# Patient Record
Sex: MALE | Race: WHITE | NOT HISPANIC OR LATINO | Employment: OTHER | URBAN - METROPOLITAN AREA
[De-identification: names, ages, dates, MRNs, and addresses within clinical notes are randomized per-mention and may not be internally consistent; named-entity substitution may affect disease eponyms.]

---

## 2018-08-02 ENCOUNTER — OFFICE VISIT (OUTPATIENT)
Dept: FAMILY MEDICINE | Facility: CLINIC | Age: 48
End: 2018-08-02
Payer: MEDICARE

## 2018-08-02 VITALS
DIASTOLIC BLOOD PRESSURE: 76 MMHG | RESPIRATION RATE: 16 BRPM | HEIGHT: 70 IN | WEIGHT: 131.4 LBS | BODY MASS INDEX: 18.81 KG/M2 | TEMPERATURE: 96.9 F | HEART RATE: 68 BPM | SYSTOLIC BLOOD PRESSURE: 110 MMHG

## 2018-08-02 DIAGNOSIS — L60.0 INGROWN LEFT BIG TOENAIL: Primary | ICD-10-CM

## 2018-08-02 PROCEDURE — 99213 OFFICE O/P EST LOW 20 MIN: CPT | Performed by: NURSE PRACTITIONER

## 2018-08-02 RX ORDER — CEPHALEXIN 500 MG/1
1000 CAPSULE ORAL 2 TIMES DAILY
Qty: 40 CAPSULE | Refills: 0 | Status: SHIPPED | OUTPATIENT
Start: 2018-08-02 | End: 2020-01-27

## 2018-08-02 ASSESSMENT — ENCOUNTER SYMPTOMS
SHORTNESS OF BREATH: 0
EYE DISCHARGE: 0
SINUS PRESSURE: 0
VOMITING: 0
FEVER: 0
SORE THROAT: 0
RHINORRHEA: 0
WHEEZING: 0
DIARRHEA: 0
HEADACHES: 0
COUGH: 0
FATIGUE: 0
DIAPHORESIS: 0
NAUSEA: 0

## 2018-08-02 ASSESSMENT — PAIN SCALES - GENERAL: PAINLEVEL: SEVERE PAIN (6)

## 2018-08-02 NOTE — PROGRESS NOTES
"  SUBJECTIVE:   Bonilla Chowdhury is a 48 year old male who presents to clinic today for the following health issues:      Chief Complaint   Patient presents with     Ingrown Toenail     Left great toenail ingrown for some time. Has been hurting for one week. Tried removing side of toenail himself. A piece of the toenail is stuck in toe. Noticed some yellow/tan pus over the last couple of days. Did apply bacitracin a few times.     Problem list and histories reviewed & adjusted, as indicated.  Additional history: as documented    Current Outpatient Prescriptions   Medication Sig Dispense Refill     cephALEXin (KEFLEX) 500 MG capsule Take 2 capsules (1,000 mg) by mouth 2 times daily 40 capsule 0     No Known Allergies    Reviewed and updated as needed this visit by clinical staff  Tobacco  Allergies  Meds  Problems  Med Hx  Surg Hx  Fam Hx  Soc Hx        Reviewed and updated as needed this visit by Provider  Problems          Left great toe has been hurting for the last week. Both big toes get ingrown due to how carries weight. Left toe is \"\" of the 2. When feels ingrown will be able to cut down and cut nail out. Tried to do it, and nail tore off and is not able to get it out.     ROS:  Review of Systems   Constitutional: Negative for diaphoresis, fatigue and fever.   HENT: Negative for congestion, ear pain, rhinorrhea, sinus pressure and sore throat.    Eyes: Negative for discharge.   Respiratory: Negative for cough, shortness of breath and wheezing.    Cardiovascular: Negative for chest pain.   Gastrointestinal: Negative for diarrhea, nausea and vomiting.   Skin:        Left great toe with tenderness and erythema    Neurological: Negative for headaches.         OBJECTIVE:     /76 (BP Location: Right arm, Patient Position: Sitting, Cuff Size: Adult Regular)  Pulse 68  Temp 96.9  F (36.1  C) (Tympanic)  Resp 16  Ht 5' 9.5\" (1.765 m)  Wt 131 lb 6.4 oz (59.6 kg)  BMI 19.13 kg/m2  Body mass index " is 19.13 kg/(m^2).  Physical Exam   Constitutional: He appears well-developed and well-nourished.   HENT:   Head: Normocephalic and atraumatic.   Right Ear: Tympanic membrane and external ear normal.   Left Ear: Tympanic membrane and external ear normal.   Nose: No mucosal edema or rhinorrhea.   Cardiovascular: Normal rate, regular rhythm and normal heart sounds.    Pulmonary/Chest: Effort normal and breath sounds normal.   Abdominal: Soft. Bowel sounds are normal.   Musculoskeletal:        Feet:    Neurological: He is alert.   Skin: Skin is warm and dry.   Psychiatric: He has a normal mood and affect.       ASSESSMENT/PLAN:   1. Ingrown left big toenail  Plan to refer to podiatry.  Interested in having bilateral great toenails removed permanently.  Encouraged to soak toes.  Educated on use of medication.  - cephALEXin (KEFLEX) 500 MG capsule; Take 2 capsules (1,000 mg) by mouth 2 times daily  Dispense: 40 capsule; Refill: 0  - PODIATRY/FOOT & ANKLE SURGERY REFERRAL        [facility]

## 2018-08-02 NOTE — MR AVS SNAPSHOT
After Visit Summary   8/2/2018    Bonilla Chowdhury    MRN: [id number]           Patient Information     Date Of Birth          1970        Visit Information        Provider Department      8/2/2018 9:40 AM Tiffanie Maria APRN CNP [facility]        Today's Diagnoses     Ingrown left big toenail    -  1       Follow-ups after your visit        Additional Services     PODIATRY/FOOT & ANKLE SURGERY REFERRAL       Your provider has referred you to: [facility]: [facility] [phone]   [email or web address]    Please be aware that coverage of these services is subject to the terms and limitations of your health insurance plan.  Call member services at your health plan with any benefit or coverage questions.      Please bring the following to your appointment:  >>   Any x-rays, CTs or MRIs which have been performed.  Contact the facility where they were done to arrange for  prior to your scheduled appointment.    >>   List of current medications   >>   This referral request   >>   Any documents/labs given to you for this referral                  Who to contact     Normal or non-critical lab and imaging results will be communicated to you by Andahart, letter or phone within 4 business days after the clinic has received the results. If you do not hear from us within 7 days, please contact the clinic through Andahart or phone. If you have a critical or abnormal lab result, we will notify you by phone as soon as possible.  Submit refill requests through [employer] or call your pharmacy and they will forward the refill request to us. Please allow 3 business days for your refill to be completed.          If you need to speak with a  for additional information , please call: [phone]           Additional Information About Your Visit        [employer] Information     [employer] lets you send messages to your doctor, view your  "test results, renew your prescriptions, schedule appointments and more. To sign up, go to [email or web address] . Click on \"Log in\" on the left side of the screen, which will take you to the Welcome page. Then click on \"Sign up Now\" on the right side of the page.     You will be asked to enter the access code listed below, as well as some personal information. Please follow the directions to create your username and password.     Your access code is: [email or web address]  Expires: 10/31/2018 10:02 AM     Your access code will  in 90 days. If you need help or a new code, please call your [facility] clinic or [phone].        Care EveryWhere ID     This is your Care EveryWhere ID. This could be used by other organizations to access your [facility] medical records  [id number]        Your Vitals Were     Pulse Temperature Respirations Height BMI (Body Mass Index)       68 96.9  F (36.1  C) (Tympanic) 16 5' 9.5\" (1.765 m) 19.13 kg/m2        Blood Pressure from Last 3 Encounters:   18 110/76   16 108/66   08/18/15 124/72    Weight from Last 3 Encounters:   18 131 lb 6.4 oz (59.6 kg)   16 145 lb (65.8 kg)   08/18/15 147 lb 6.4 oz (66.9 kg)              We Performed the Following     PODIATRY/FOOT & ANKLE SURGERY REFERRAL          Today's Medication Changes          These changes are accurate as of 18 10:02 AM.  If you have any questions, ask your nurse or doctor.               Start taking these medicines.        Dose/Directions    cephALEXin 500 MG capsule   Commonly known as:  KEFLEX   Used for:  Ingrown left big toenail   Started by:  Tiffanie Maria APRN CNP        Dose:  1000 mg   Take 2 capsules (1,000 mg) by mouth 2 times daily   Quantity:  40 capsule   Refills:  0            Where to get your medicines      These medications were sent to [facility] [address] [facility] [place], [place] - [address]  [address], [place] [facility] [address]     Phone:  [phone]     cephALEXin 500 MG " capsule                Primary Care Provider Office Phone # Fax #    Deja Meyers PA-C [phone] [address] [place] [address]        Equal Access to Services     DAYAN GAO : Hadii gege ku haddavido Sofelishaali, waaxda luqadaha, qaybta kaalmada adeegyada, waxdennise krusen inessa brenner laEarnestvelvet holland. So [facility] [phone].    ATENCIÓN: Si habla español, tiene a knight disposición servicios gratuitos de asistencia lingüística. Llame al [phone].    We comply with applicable federal civil rights laws and [place] laws. We do not discriminate on the basis of race, color, national origin, age, disability, sex, sexual orientation, or gender identity.            Thank you!     Thank you for choosing [facility]  for your care. Our goal is always to provide you with excellent care. Hearing back from our patients is one way we can continue to improve our services. Please take a few minutes to complete the written survey that you may receive in the mail after your visit with us. Thank you!             Your Updated Medication List - Protect others around you: Learn how to safely use, store and throw away your medicines at www.disposemymeds.org.          This list is accurate as of 8/2/18 10:02 AM.  Always use your most recent med list.                   Brand Name Dispense Instructions for use Diagnosis    cephALEXin 500 MG capsule    KEFLEX    40 capsule    Take 2 capsules (1,000 mg) by mouth 2 times daily    Ingrown left big toenail

## 2018-08-03 ENCOUNTER — OFFICE VISIT (OUTPATIENT)
Dept: PODIATRY | Facility: CLINIC | Age: 48
End: 2018-08-03
Payer: MEDICARE

## 2018-08-03 VITALS — HEIGHT: 70 IN | HEART RATE: 68 BPM | BODY MASS INDEX: 18.75 KG/M2 | WEIGHT: 131 LBS

## 2018-08-03 DIAGNOSIS — L60.0 INGROWING NAIL, RIGHT GREAT TOE: ICD-10-CM

## 2018-08-03 DIAGNOSIS — L60.0 INGROWING LEFT GREAT TOENAIL: Primary | ICD-10-CM

## 2018-08-03 PROCEDURE — 99203 OFFICE O/P NEW LOW 30 MIN: CPT | Mod: 25 | Performed by: PODIATRIST

## 2018-08-03 PROCEDURE — 11750 EXCISION NAIL&NAIL MATRIX: CPT | Performed by: PODIATRIST
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
## 2018-08-03 NOTE — MR AVS SNAPSHOT
After Visit Summary   8/3/2018    Bonilla Chowdhury    MRN: [id number]           Patient Information     Date Of Birth          1970        Visit Information        Provider Department      8/3/2018 11:00 AM Jeanmarie Zayas DPM [facility]        Today's Diagnoses     Ingrowing left great toenail    -  1    Ingrowing nail, right great toe          Care Instructions    Post toenail procedure instructions:    1.  Keep bandage on the rest of the day; take off in the morning.  2.  Soak the toe in warm water with Epsom's Salt or Dreft detergent for 20 min.  3.  Clean out any scab tissue from the treated area with a soapy cloth.  4.  Apply a topical antibiotic to the treated area and bandage with a Band-Aid.  5.  Repeat morning and night for two weeks    You have elected to proceed with Surgery for total surgical nail matrixectomy right great toe  Surgeries are performed on Tuesdays at [facility].   To schedule your surgery date please call [phone].  Please leave a message with a good time for our staff to call you back.    - Please have a date in mind for your surgery, you can feel free to leave that date on the message, and we will schedule and call back to confirm.     You can expect receive a call back the same day or on the next business day from Dr. Zayas s team to assist in the scheduling.   - We will schedule the date of your surgery.  The time will be determined a few days ahead of time.  You can expect a call from Same Day Surgery 2-3 days ahead of time with specific instructions for what time to arrive at the hospital as well as any other preparations you should take prior to surgery.    - You may need to obtain a pre-operative physical from your primary medical provider. This must be done within 30 days of your surgery date.    - We will also schedule your first post-operative appointment for a bandage and wound check for the Monday following your  "surgery at the [place] location.    - You may be non-weight bearing for a period of up to 6 weeks.  Options for this include: (Please indicate which you would prefer so we can provide you with an order and instructions)  o Crutches  o Walker  o Roll-a-bout knee walker.    - If you will need paperwork filled out for your employer you may drop those off at the clinic directly or you may have those faxed to us at [phone].  Please indicate on the form the date you would like the FMLA to begin if it will not be your surgery date.    The forms are typically filled out for up to 12 weeks, however you may be cleared to return prior to that time depending on your individual healing and job requirements.              Follow-ups after your visit        Follow-up notes from your care team     Return if symptoms worsen or fail to improve.      Who to contact     If you have questions or need follow up information about today's clinic visit or your schedule please contact [facility] WANDA SYBIL directly at [phone].  Normal or non-critical lab and imaging results will be communicated to you by [employer], letter or phone within 4 business days after the clinic has received the results. If you do not hear from us within 7 days, please contact the clinic through [employer] or phone. If you have a critical or abnormal lab result, we will notify you by phone as soon as possible.  Submit refill requests through [employer] or call your pharmacy and they will forward the refill request to us. Please allow 3 business days for your refill to be completed.          Additional Information About Your Visit        [employer] Information     [employer] lets you send messages to your doctor, view your test results, renew your prescriptions, schedule appointments and more. To sign up, go to [email or web address] . Click on \"Log in\" on the left side of the screen, which will take you to the Welcome page. Then click on \"Sign up Now\" on the right " "side of the page.     You will be asked to enter the access code listed below, as well as some personal information. Please follow the directions to create your username and password.     Your access code is: [email or web address]  Expires: 10/31/2018 10:02 AM     Your access code will  in 90 days. If you need help or a new code, please call your [place] clinic or [phone].        Care EveryWhere ID     This is your Care EveryWhere ID. This could be used by other organizations to access your [place] medical records  [id number]        Your Vitals Were     Pulse Height BMI (Body Mass Index)             68 5' 9.5\" (1.765 m) 19.07 kg/m2          Blood Pressure from Last 3 Encounters:   18 110/76   16 108/66   08/18/15 124/72    Weight from Last 3 Encounters:   18 131 lb (59.4 kg)   18 131 lb 6.4 oz (59.6 kg)   16 145 lb (65.8 kg)              We Performed the Following     Gretchen-Operative Worksheet     REMOVAL NAIL/NAIL BED, PARTIAL OR COMPLETE        Primary Care Provider Office Phone # Fax #    [facility] [phone] [address] [address]        Equal Access to Services     DAYAN GAO : Hadii gege turnero Soelsy, waaxda luqadaha, qaybta kaalmada adeegyada, antonio torres [facility] camp . So [facility] [phone].    ATENCIÓN: Si habla español, tiene a knight disposición servicios gratuitos de asistencia lingüística. Llame al [phone].    We comply with applicable federal civil rights laws and [place] laws. We do not discriminate on the basis of race, color, national origin, age, disability, sex, sexual orientation, or gender identity.            Thank you!     Thank you for choosing [facility]  for your care. Our goal is always to provide you with excellent care. Hearing back from our patients is one way we can continue to improve our services. Please take a few minutes to complete the written survey " that you may receive in the mail after your visit with us. Thank you!             Your Updated Medication List - Protect others around you: Learn how to safely use, store and throw away your medicines at www.disposemymeds.org.          This list is accurate as of 8/3/18 11:26 AM.  Always use your most recent med list.                   Brand Name Dispense Instructions for use Diagnosis    cephALEXin 500 MG capsule    KEFLEX    40 capsule    Take 2 capsules (1,000 mg) by mouth 2 times daily    Ingrown left big toenail

## 2018-08-03 NOTE — PATIENT INSTRUCTIONS
Post toenail procedure instructions:    1.  Keep bandage on the rest of the day; take off in the morning.  2.  Soak the toe in warm water with Epsom's Salt or Dreft detergent for 20 min.  3.  Clean out any scab tissue from the treated area with a soapy cloth.  4.  Apply a topical antibiotic to the treated area and bandage with a Band-Aid.  5.  Repeat morning and night for two weeks    You have elected to proceed with Surgery for total surgical nail matrixectomy right great toe  Surgeries are performed on Tuesdays at [facility].   To schedule your surgery date please call [phone].  Please leave a message with a good time for our staff to call you back.    - Please have a date in mind for your surgery, you can feel free to leave that date on the message, and we will schedule and call back to confirm.     You can expect receive a call back the same day or on the next business day from Dr. Zayas s team to assist in the scheduling.   - We will schedule the date of your surgery.  The time will be determined a few days ahead of time.  You can expect a call from Same Day Surgery 2-3 days ahead of time with specific instructions for what time to arrive at the hospital as well as any other preparations you should take prior to surgery.    - You may need to obtain a pre-operative physical from your primary medical provider. This must be done within 30 days of your surgery date.    - We will also schedule your first post-operative appointment for a bandage and wound check for the Monday following your surgery at the [place] location.    - You may be non-weight bearing for a period of up to 6 weeks.  Options for this include: (Please indicate which you would prefer so we can provide you with an order and instructions)  o Crutches  o Walker  o Roll-a-bout knee walker.    - If you will need paperwork filled out for your employer you may drop those off at the clinic directly or you may have those faxed to us  at [phone].  Please indicate on the form the date you would like the FMLA to begin if it will not be your surgery date.    The forms are typically filled out for up to 12 weeks, however you may be cleared to return prior to that time depending on your individual healing and job requirements.

## 2020-01-24 ENCOUNTER — OFFICE VISIT (OUTPATIENT)
Dept: PODIATRY | Facility: CLINIC | Age: 50
End: 2020-01-24
Payer: MEDICARE

## 2020-01-24 VITALS
WEIGHT: 136 LBS | HEIGHT: 71 IN | BODY MASS INDEX: 19.04 KG/M2 | SYSTOLIC BLOOD PRESSURE: 120 MMHG | DIASTOLIC BLOOD PRESSURE: 75 MMHG | HEART RATE: 80 BPM

## 2020-01-24 DIAGNOSIS — L60.0 INGROWN TOENAIL OF RIGHT FOOT: Primary | ICD-10-CM

## 2020-01-24 PROCEDURE — 99213 OFFICE O/P EST LOW 20 MIN: CPT | Performed by: PODIATRIST

## 2020-01-24 ASSESSMENT — MIFFLIN-ST. JEOR: SCORE: 1514.02
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
## 2020-01-24 NOTE — NURSING NOTE
"Chief Complaint   Patient presents with     Consult     Great right toenail ingrown, wants to discuss surgery       Initial /75   Pulse 80   Ht 1.803 m (5' 11\")   Wt 61.7 kg (136 lb)   BMI 18.97 kg/m   Estimated body mass index is 18.97 kg/m  as calculated from the following:    Height as of this encounter: 1.803 m (5' 11\").    Weight as of this encounter: 61.7 kg (136 lb).  Medications and allergies reviewed.      Aurea LOO MA    "

## 2020-01-27 PROBLEM — L60.0 INGROWN TOENAIL OF RIGHT FOOT: Status: ACTIVE | Noted: 2020-01-27

## 2020-01-27 NOTE — PATIENT INSTRUCTIONS
You have elected to proceed with Surgery for total surgical nail matrixectomy right great toe  Surgeries are performed on Tuesdays at [facility].   To schedule your surgery date please call [phone].  Please leave a message with a good time for our staff to call you back.    - Please have a date in mind for your surgery, you can feel free to leave that date on the message, and we will schedule and call back to confirm.     You can expect receive a call back the same day or on the next business day from Dr. Zayas s team to assist in the scheduling.   - We will schedule the date of your surgery.  The time will be determined a few days ahead of time.  You can expect a call from Same Day Surgery 2-3 days ahead of time with specific instructions for what time to arrive at the hospital as well as any other preparations you should take prior to surgery.    - You may need to obtain a pre-operative physical from your primary medical provider. This must be done within 30 days of your surgery date.    - We will also schedule your first post-operative appointment for a bandage and wound check for the Monday following your surgery at the [facility].    - You may be non-weight bearing for a period of up to 6 weeks.  Options for this include: (Please indicate which you would prefer so we can provide you with an order and instructions)  o Crutches  o Walker  o Roll-a-bout knee walker.    - If you will need paperwork filled out for your employer you may drop those off at the clinic directly or you may have those faxed to us at [phone].  Please indicate on the form the date you would like the [place] to begin if it will not be your surgery date.    The forms are typically filled out for up to 12 weeks, however you may be cleared to return prior to that time depending on your individual healing and job requirements.

## 2020-01-28 ENCOUNTER — HOSPITAL ENCOUNTER (OUTPATIENT)
Facility: CLINIC | Age: 50
Discharge: HOME | End: 2020-01-28
Attending: PODIATRIST | Admitting: PODIATRIST
Payer: MEDICARE

## 2020-01-28 VITALS
TEMPERATURE: 98.8 F | HEIGHT: 71 IN | SYSTOLIC BLOOD PRESSURE: 115 MMHG | RESPIRATION RATE: 16 BRPM | HEART RATE: 68 BPM | WEIGHT: 136 LBS | DIASTOLIC BLOOD PRESSURE: 84 MMHG | BODY MASS INDEX: 19.04 KG/M2 | OXYGEN SATURATION: 100 %

## 2020-01-28 DIAGNOSIS — L60.0 INGROWN TOENAIL OF RIGHT FOOT: ICD-10-CM

## 2020-01-28 PROCEDURE — 71000027 ZZH RECOVERY PHASE 2 EACH 15 MINS: Performed by: PODIATRIST

## 2020-01-28 PROCEDURE — 40000306 ZZH STATISTIC PRE PROC ASSESS II: Performed by: PODIATRIST

## 2020-01-28 PROCEDURE — 25000128 H RX IP 250 OP 636: Performed by: PODIATRIST

## 2020-01-28 PROCEDURE — 25000125 ZZHC RX 250: Performed by: PODIATRIST

## 2020-01-28 PROCEDURE — 36000050 ZZH SURGERY LEVEL 2 1ST 30 MIN: Performed by: PODIATRIST

## 2020-01-28 PROCEDURE — 27210794 ZZH OR GENERAL SUPPLY STERILE: Performed by: PODIATRIST

## 2020-01-28 PROCEDURE — 25800030 ZZH RX IP 258 OP 636: Performed by: PODIATRIST

## 2020-01-28 PROCEDURE — 11750 EXCISION NAIL&NAIL MATRIX: CPT | Mod: T5 | Performed by: PODIATRIST

## 2020-01-28 RX ORDER — CEFAZOLIN SODIUM 2 G/100ML
2 INJECTION, SOLUTION INTRAVENOUS
Status: COMPLETED | OUTPATIENT
Start: 2020-01-28 | End: 2020-01-28

## 2020-01-28 RX ORDER — HYDROCODONE BITARTRATE AND ACETAMINOPHEN 5; 325 MG/1; MG/1
1-2 TABLET ORAL EVERY 4 HOURS PRN
Qty: 25 TABLET | Refills: 0 | Status: SHIPPED | OUTPATIENT
Start: 2020-01-28

## 2020-01-28 RX ORDER — CEFAZOLIN SODIUM 1 G/50ML
1 INJECTION, SOLUTION INTRAVENOUS SEE ADMIN INSTRUCTIONS
Status: DISCONTINUED | OUTPATIENT
Start: 2020-01-28 | End: 2020-01-28 | Stop reason: HOSPADM

## 2020-01-28 RX ORDER — MAGNESIUM HYDROXIDE 1200 MG/15ML
LIQUID ORAL PRN
Status: DISCONTINUED | OUTPATIENT
Start: 2020-01-28 | End: 2020-01-28 | Stop reason: HOSPADM

## 2020-01-28 RX ORDER — AMOXICILLIN 250 MG
1-2 CAPSULE ORAL 2 TIMES DAILY
Qty: 30 TABLET | Refills: 0 | Status: SHIPPED | OUTPATIENT
Start: 2020-01-28

## 2020-01-28 RX ORDER — BUPIVACAINE HYDROCHLORIDE 5 MG/ML
INJECTION, SOLUTION PERINEURAL PRN
Status: DISCONTINUED | OUTPATIENT
Start: 2020-01-28 | End: 2020-01-28 | Stop reason: HOSPADM

## 2020-01-28 RX ORDER — OXYCODONE HYDROCHLORIDE 5 MG/1
5 TABLET ORAL
Status: DISCONTINUED | OUTPATIENT
Start: 2020-01-28 | End: 2020-01-28 | Stop reason: HOSPADM

## 2020-01-28 RX ORDER — SODIUM CHLORIDE, SODIUM LACTATE, POTASSIUM CHLORIDE, CALCIUM CHLORIDE 600; 310; 30; 20 MG/100ML; MG/100ML; MG/100ML; MG/100ML
INJECTION, SOLUTION INTRAVENOUS CONTINUOUS
Status: DISCONTINUED | OUTPATIENT
Start: 2020-01-28 | End: 2020-01-28 | Stop reason: HOSPADM

## 2020-01-28 RX ORDER — LIDOCAINE HYDROCHLORIDE 10 MG/ML
INJECTION, SOLUTION INFILTRATION; PERINEURAL PRN
Status: DISCONTINUED | OUTPATIENT
Start: 2020-01-28 | End: 2020-01-28 | Stop reason: HOSPADM

## 2020-01-28 RX ADMIN — LIDOCAINE HYDROCHLORIDE 1 ML: 10 INJECTION, SOLUTION EPIDURAL; INFILTRATION; INTRACAUDAL; PERINEURAL at 10:44

## 2020-01-28 RX ADMIN — CEFAZOLIN SODIUM 2 G: 2 INJECTION, SOLUTION INTRAVENOUS at 12:08

## 2020-01-28 RX ADMIN — SODIUM CHLORIDE, POTASSIUM CHLORIDE, SODIUM LACTATE AND CALCIUM CHLORIDE: 600; 310; 30; 20 INJECTION, SOLUTION INTRAVENOUS at 10:44

## 2020-01-28 ASSESSMENT — MIFFLIN-ST. JEOR: SCORE: 1514.02

## 2020-01-28 NOTE — BRIEF OP NOTE
[facility]    Brief Operative Note    Pre-operative diagnosis: Ingrown toenail of right foot [L60.0]  Post-operative diagnosis Same as pre-operative diagnosis    Procedure: Procedure(s):  Total nail matrixectomy right great toe  Surgeon: Surgeon(s) and Role:     * Jeanmarie Zayas, EDWIGE - Primary  Anesthesia: Local   Estimated blood loss: Less than 10 ml  Drains: None  Specimens: * No specimens in log *  Findings:   None.  Complications: None.  Implants: * No implants in log *

## 2020-01-28 NOTE — OP NOTE
PREOPERATIVE DIAGNOSIS: 1. Ingrown toe, right  foot.   POSTOPERATIVE DIAGNOSIS: 1. Ingrown toe, right  foot.   PROCEDURE: 1. Total surgical nail matrixectomy, great toe, right  foot.   PATHOLOGY: None.   ANESTHESIA: Local   ESTIMATED BLOOD LOSS: Less than 10 mL   INDICATIONS FOR PROCEDURE: Bonilla Chowdhury is a 49 year old male with a chronic ingrown toenail of the great toe of the right  foot. The patient was consented for total surgical nail matrixectomy of the great toe, right  foot.   PROCEDURE IN DETAIL: the patient in the operating room and placed on the operating table in the supine position. Approximately 9cc of 1% buffered lidocaine was injected around the great toe of the right  foot. The foot was then scrubbed, prepped and draped in the usual aseptic manner. A toe tourniquet was placed around the right  great toe.   The procedure began with avulsion of the nail plate from the great toe, right  foot. The underlying nail matrix was excised from the underlying proximal phalanx.   The wound was irrigated with copious amounts of normal sterile saline. Tourniquet was deflated and prompt hyperemic response was noted to all five digits of the right  foot. The skin was reapproximated utilizing 4-0 nylon suture in a simple interrupted technique. The area was blocked with approximately 6cc of 0.5% Marcaine plain. The wound was dressed with sterile bacitracin, Xeroform, 4 x 4s, cast padding and an Ace wrap. The patient tolerated the anesthesia and procedure well, and was transferred to the PACU with vital signs stable and vascular status intact to the right  lower extremity.   SERVANDO ANTUNEZ DPM, FACFAS

## 2020-02-03 ENCOUNTER — OFFICE VISIT (OUTPATIENT)
Dept: PODIATRY | Facility: CLINIC | Age: 50
End: 2020-02-03
Payer: MEDICARE

## 2020-02-03 VITALS
WEIGHT: 136 LBS | BODY MASS INDEX: 19.04 KG/M2 | HEIGHT: 71 IN | DIASTOLIC BLOOD PRESSURE: 86 MMHG | SYSTOLIC BLOOD PRESSURE: 136 MMHG | HEART RATE: 73 BPM

## 2020-02-03 DIAGNOSIS — L60.0 INGROWN TOENAIL OF RIGHT FOOT: Primary | ICD-10-CM

## 2020-02-03 DIAGNOSIS — Z98.890 S/P FOOT SURGERY, RIGHT: ICD-10-CM

## 2020-02-03 PROCEDURE — 99024 POSTOP FOLLOW-UP VISIT: CPT | Performed by: PODIATRIST

## 2020-02-03 ASSESSMENT — MIFFLIN-ST. JEOR: SCORE: 1514.02

## 2020-02-03 NOTE — PATIENT INSTRUCTIONS
Post toenail procedure instructions:    1.  Keep the wound on the great toe covered with a thin layer of antibiotic ointment with lidocaine along with a large Band-Aid.  Change the Band-Aid daily.  2.  Left foot warm and dry.  3.  Return in 2 weeks for reevaluation.

## 2020-02-03 NOTE — LETTER
2/3/2020         RE: Bonilla Chowdhury  [address] [address]        Dear Colleague,    Thank you for referring your patient, Bonilla Chowdhury, to the [facility]. Please see a copy of my visit note below.    Bonilla presents to the office s/p two weeks total nail matrixectomy to the right great toe.  The patient relates following the post operative instructions with no signs of infection noted.    Physical Exam:    General: The patient appears to be in no distress and in good spirits.  The treated area appears to have minimal erythema and edema.  No noted drainage noted.    Assessment: Paranychia status post two weeks total nail matrixectomy to the right great toe.    Plan:  At this point, the patient was instructed to continue with the postoperative care instructions until the redness and swelling resolve.  The patient was instructed to return to the office if any problems arise.    KING Zayas DPM, FACFAS      Again, thank you for allowing me to participate in the care of your patient.        Sincerely,        Jeanmarie Zayas DPM

## 2020-02-03 NOTE — NURSING NOTE
"Chief Complaint   Patient presents with     Surgical Followup     Nail matrixectomy, DOS 01/28/2020       Initial /86   Pulse 73   Ht 1.803 m (5' 11\")   Wt 61.7 kg (136 lb)   BMI 18.97 kg/m   Estimated body mass index is 18.97 kg/m  as calculated from the following:    Height as of this encounter: 1.803 m (5' 11\").    Weight as of this encounter: 61.7 kg (136 lb).  Medications and allergies reviewed.      Aurea LOO MA    "

## 2020-02-08 ENCOUNTER — HEALTH MAINTENANCE LETTER (OUTPATIENT)
Age: 50
End: 2020-02-08

## 2020-02-14 ENCOUNTER — OFFICE VISIT (OUTPATIENT)
Dept: PODIATRY | Facility: CLINIC | Age: 50
End: 2020-02-14
Payer: MEDICARE

## 2020-02-14 VITALS
HEIGHT: 71 IN | HEART RATE: 67 BPM | BODY MASS INDEX: 19.04 KG/M2 | SYSTOLIC BLOOD PRESSURE: 126 MMHG | WEIGHT: 136 LBS | DIASTOLIC BLOOD PRESSURE: 81 MMHG

## 2020-02-14 DIAGNOSIS — Z98.890 S/P FOOT SURGERY, RIGHT: ICD-10-CM

## 2020-02-14 DIAGNOSIS — L60.0 INGROWN TOENAIL OF RIGHT FOOT: Primary | ICD-10-CM

## 2020-02-14 PROCEDURE — 99024 POSTOP FOLLOW-UP VISIT: CPT | Performed by: PODIATRIST

## 2020-02-14 ASSESSMENT — MIFFLIN-ST. JEOR: SCORE: 1514.02

## 2020-02-14 NOTE — LETTER
2/14/2020         RE: Bonilla Chowdhury  [address] [address]        Dear Colleague,    Thank you for referring your patient, Bonilla Chowdhury, to the [facility]. Please see a copy of my visit note below.    Bonilla presents to the office s/p two weeks partial nail matrixectomy to the right great toe.  The patient relates following the post operative instructions with no signs of infection noted.    Physical Exam:    General: The patient appears to be in no distress and in good spirits.  The treated area appears to have minimal erythema and edema.  No noted drainage noted.    Assessment: Paranychia status post two weeks partial nail matrixectomy to the right great toe.    Plan:  At this point, the patient was instructed to continue with the postoperative care instructions until the redness and swelling resolve.  The patient was instructed to return to the office if any problems arise.    KING Zayas DPM, FACFAS        Again, thank you for allowing me to participate in the care of your patient.        Sincerely,        Jeanmarie Zayas DPM

## 2020-02-14 NOTE — PROGRESS NOTES
Bonilla presents to the office s/p two weeks partial nail matrixectomy to the right great toe.  The patient relates following the post operative instructions with no signs of infection noted.    Physical Exam:    General: The patient appears to be in no distress and in good spirits.  The treated area appears to have minimal erythema and edema.  No noted drainage noted.    Assessment: Paranychia status post two weeks partial nail matrixectomy to the right great toe.    Plan:  At this point, the patient was instructed to continue with the postoperative care instructions until the redness and swelling resolve.  The patient was instructed to return to the office if any problems arise.    KING Zayas DPM, FACFAS

## 2020-02-14 NOTE — NURSING NOTE
"Chief Complaint   Patient presents with     Surgical Followup     DOS 01/28/2020, Total surgical nail matrixectomy, great toe, right  foot.        Initial /81   Pulse 67   Ht 1.803 m (5' 11\")   Wt 61.7 kg (136 lb)   BMI 18.97 kg/m   Estimated body mass index is 18.97 kg/m  as calculated from the following:    Height as of this encounter: 1.803 m (5' 11\").    Weight as of this encounter: 61.7 kg (136 lb).  Medications and allergies reviewed.      Aurea LOO MA    "

## 2020-03-13 ENCOUNTER — OFFICE VISIT (OUTPATIENT)
Dept: PODIATRY | Facility: CLINIC | Age: 50
End: 2020-03-13
Payer: MEDICARE

## 2020-03-13 VITALS
HEART RATE: 71 BPM | SYSTOLIC BLOOD PRESSURE: 121 MMHG | BODY MASS INDEX: 19.04 KG/M2 | WEIGHT: 136 LBS | RESPIRATION RATE: 16 BRPM | HEIGHT: 71 IN | DIASTOLIC BLOOD PRESSURE: 78 MMHG

## 2020-03-13 DIAGNOSIS — L60.0 INGROWN TOENAIL OF RIGHT FOOT: Primary | ICD-10-CM

## 2020-03-13 DIAGNOSIS — Z98.890 S/P FOOT SURGERY, RIGHT: ICD-10-CM

## 2020-03-13 PROCEDURE — 99212 OFFICE O/P EST SF 10 MIN: CPT | Performed by: PODIATRIST

## 2020-03-13 ASSESSMENT — MIFFLIN-ST. JEOR: SCORE: 1514.02

## 2020-03-13 NOTE — NURSING NOTE
"Chief Complaint   Patient presents with     Surgical Followup     DOS 01/28/2020, Total surgical nail matrixectomy, great toe, right  foot       Initial /78 (BP Location: Right arm, Patient Position: Chair, Cuff Size: Adult Regular)   Pulse 71   Resp 16   Ht 1.803 m (5' 11\")   Wt 61.7 kg (136 lb)   BMI 18.97 kg/m   Estimated body mass index is 18.97 kg/m  as calculated from the following:    Height as of this encounter: 1.803 m (5' 11\").    Weight as of this encounter: 61.7 kg (136 lb).  Medications and allergies reviewed.    Korina LOO CMA ([facility])    "

## 2020-03-13 NOTE — LETTER
3/13/2020         RE: Bonilla Chowdhury  [address] [address]        Dear Colleague,    Thank you for referring your patient, Bonilla Chowdhury, to the [facility]. Please see a copy of my visit note below.    Bonilla presents to the office s/p two weeks partial nail matrixectomy to the right great toe.  The patient relates following the post operative instructions with no signs of infection noted.    Physical Exam:    General: The patient appears to be in no distress and in good spirits.  The treated area appears to have minimal erythema and edema.  No noted drainage noted.    Assessment: Paranychia status post two weeks partial nail matrixectomy to the right great toe.    Plan:  At this point, the patient was instructed to continue with the postoperative care instructions until the redness and swelling resolve.  The patient was instructed to return to the office if any problems arise.    KING Zayas DPM, FACFAS      Again, thank you for allowing me to participate in the care of your patient.        Sincerely,        Jeanmarie Zayas DPM

## 2020-11-08 ENCOUNTER — HEALTH MAINTENANCE LETTER (OUTPATIENT)
Age: 50
End: 2020-11-08

## 2021-03-28 ENCOUNTER — HEALTH MAINTENANCE LETTER (OUTPATIENT)
Age: 51
End: 2021-03-28

## 2021-09-11 ENCOUNTER — HEALTH MAINTENANCE LETTER (OUTPATIENT)
Age: 51
End: 2021-09-11

## 2022-04-23 ENCOUNTER — HEALTH MAINTENANCE LETTER (OUTPATIENT)
Age: 52
End: 2022-04-23

## 2022-10-30 ENCOUNTER — HEALTH MAINTENANCE LETTER (OUTPATIENT)
Age: 52
End: 2022-10-30

## 2023-06-01 ENCOUNTER — HEALTH MAINTENANCE LETTER (OUTPATIENT)
Age: 53
End: 2023-06-01

## 2024-04-29 ENCOUNTER — HOSPITAL ENCOUNTER (EMERGENCY)
Facility: HOSPITAL | Age: 54
Discharge: HOME | End: 2024-04-29
Attending: EMERGENCY MEDICINE | Admitting: EMERGENCY MEDICINE
Payer: MEDICARE

## 2024-04-29 VITALS
TEMPERATURE: 98.1 F | RESPIRATION RATE: 20 BRPM | BODY MASS INDEX: 19.76 KG/M2 | HEART RATE: 110 BPM | OXYGEN SATURATION: 98 % | DIASTOLIC BLOOD PRESSURE: 91 MMHG | WEIGHT: 138 LBS | HEIGHT: 70 IN | SYSTOLIC BLOOD PRESSURE: 135 MMHG

## 2024-04-29 DIAGNOSIS — M25.561 RIGHT KNEE PAIN, UNSPECIFIED CHRONICITY: ICD-10-CM

## 2024-04-29 PROCEDURE — 96372 THER/PROPH/DIAG INJ SC/IM: CPT | Performed by: EMERGENCY MEDICINE

## 2024-04-29 PROCEDURE — 250N000011 HC RX IP 250 OP 636: Performed by: EMERGENCY MEDICINE

## 2024-04-29 PROCEDURE — 99284 EMERGENCY DEPT VISIT MOD MDM: CPT

## 2024-04-29 RX ORDER — KETOROLAC TROMETHAMINE 30 MG/ML
30 INJECTION, SOLUTION INTRAMUSCULAR; INTRAVENOUS ONCE
Status: COMPLETED | OUTPATIENT
Start: 2024-04-29 | End: 2024-04-29

## 2024-04-29 RX ORDER — HYDROCODONE BITARTRATE AND ACETAMINOPHEN 5; 325 MG/1; MG/1
1 TABLET ORAL EVERY 6 HOURS PRN
Qty: 5 TABLET | Refills: 0 | Status: SHIPPED | OUTPATIENT
Start: 2024-04-29 | End: 2024-05-02

## 2024-04-29 RX ADMIN — KETOROLAC TROMETHAMINE 30 MG: 30 INJECTION, SOLUTION INTRAMUSCULAR at 06:57

## 2024-04-29 ASSESSMENT — COLUMBIA-SUICIDE SEVERITY RATING SCALE - C-SSRS
2. HAVE YOU ACTUALLY HAD ANY THOUGHTS OF KILLING YOURSELF IN THE PAST MONTH?: NO
1. IN THE PAST MONTH, HAVE YOU WISHED YOU WERE DEAD OR WISHED YOU COULD GO TO SLEEP AND NOT WAKE UP?: NO
6. HAVE YOU EVER DONE ANYTHING, STARTED TO DO ANYTHING, OR PREPARED TO DO ANYTHING TO END YOUR LIFE?: NO

## 2024-04-29 ASSESSMENT — ACTIVITIES OF DAILY LIVING (ADL): ADLS_ACUITY_SCORE: 38

## 2024-04-29 NOTE — DISCHARGE INSTRUCTIONS
Recommend talking to your primary care provider about potential for steroid injections, nerve conduction studies, and potentially even gabapentin if this is considered to be nerve pain.  Return to the emergency department for  worsening symptoms or any other concern.

## 2024-04-29 NOTE — ED TRIAGE NOTES
Pt arrives to triage for right knee pain that pt reports is due to his nerves. Feb 27th is when the pt reports he rolled over in his sleep and twisted his knee and began the pain for this episode. He was seen at [facility] and reports they ruled out issues with his muscles or bones.

## 2024-06-09 ENCOUNTER — HEALTH MAINTENANCE LETTER (OUTPATIENT)
Age: 54
End: 2024-06-09
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
## 2025-06-15 ENCOUNTER — HEALTH MAINTENANCE LETTER (OUTPATIENT)
Age: 55
End: 2025-06-15

## (undated) DEVICE — SOL WATER IRRIG 1000ML BOTTLE 07139-09

## (undated) DEVICE — NDL 18GA 1.5" 305196

## (undated) DEVICE — GOWN XLG DISP 9545

## (undated) DEVICE — NDL 25GA 1.5" 305127

## (undated) DEVICE — PACK EXTREMITY LATEX FREE SOP32HFFCS

## (undated) DEVICE — CAST PADDING 4" STERILE 9044S

## (undated) DEVICE — BNDG ELASTIC 4"X5YDS UNSTERILE 6611-40

## (undated) DEVICE — GLOVE PROTEXIS BLUE W/NEU-THERA 8.0  2D73EB80

## (undated) DEVICE — BNDG COBAN 2"X5YDS UNSTERILE 2082

## (undated) DEVICE — DECANTER VIAL 2006S

## (undated) DEVICE — GLOVE PROTEXIS W/NEU-THERA 8.0  2D73TE80

## (undated) DEVICE — SOL NACL 0.9% IRRIG 1000ML BOTTLE 07138-09

## (undated) DEVICE — DRAPE SHEET REV FOLD 3/4 9349

## (undated) DEVICE — SYR 10ML LL W/O NDL

## (undated) DEVICE — DRAPE STOCKINETTE IMPERVIOUS 08" LATEX 1586

## (undated) DEVICE — PREP CHLORAPREP 26ML TINTED ORANGE  260815

## (undated) DEVICE — TOURNIQUET TOURNI-COT FINGER GREEN LG  TCL

## (undated) DEVICE — PREP PAD ALCOHOL 6818

## (undated) DEVICE — DRSG XEROFORM 1X8"

## (undated) DEVICE — SU ETHILON 4-0 PS-2 18" 1667G

## (undated) DEVICE — DRAPE EXTREMITY W/ARMBOARD 29405

## (undated) RX ORDER — CEFAZOLIN SODIUM 2 G/100ML
INJECTION, SOLUTION INTRAVENOUS
Status: DISPENSED
Start: 2020-01-28

## (undated) RX ORDER — GINSENG 100 MG
CAPSULE ORAL
Status: DISPENSED
Start: 2020-01-28

## (undated) RX ORDER — BUPIVACAINE HYDROCHLORIDE 5 MG/ML
INJECTION, SOLUTION PERINEURAL
Status: DISPENSED
Start: 2020-01-28